# Patient Record
Sex: MALE | Race: WHITE | NOT HISPANIC OR LATINO
[De-identification: names, ages, dates, MRNs, and addresses within clinical notes are randomized per-mention and may not be internally consistent; named-entity substitution may affect disease eponyms.]

---

## 2022-08-25 PROBLEM — Z00.00 ENCOUNTER FOR PREVENTIVE HEALTH EXAMINATION: Status: ACTIVE | Noted: 2022-08-25

## 2022-09-06 ENCOUNTER — APPOINTMENT (OUTPATIENT)
Dept: ORTHOPEDIC SURGERY | Facility: CLINIC | Age: 57
End: 2022-09-06

## 2023-02-03 ENCOUNTER — APPOINTMENT (OUTPATIENT)
Dept: ORTHOPEDIC SURGERY | Facility: CLINIC | Age: 58
End: 2023-02-03
Payer: MEDICARE

## 2023-02-03 VITALS — BODY MASS INDEX: 26.21 KG/M2 | WEIGHT: 167 LBS | HEIGHT: 67 IN

## 2023-02-03 PROCEDURE — 73522 X-RAY EXAM HIPS BI 3-4 VIEWS: CPT

## 2023-02-03 PROCEDURE — 99204 OFFICE O/P NEW MOD 45 MIN: CPT

## 2023-02-03 RX ORDER — MELOXICAM 15 MG/1
15 TABLET ORAL
Qty: 30 | Refills: 2 | Status: ACTIVE | COMMUNITY
Start: 2023-02-03 | End: 1900-01-01

## 2023-02-03 NOTE — IMAGING
[de-identified] : Constitutional: well developed and well nourished, able to communicate\par Cardiovascular: Peripheral vascular exam is grossly normal\par Neurologic: Alert and oriented, no acute distress.\par Skin: normal skin with no ulcers, rashes, or lesions\par Pulmonary: No respiratory distress, breathing comfortably on room air\par Lymphatics: No obvious lymphadenopathy or lymphedema in areas examined\par \par LOWER EXTREMITY/ RIGHT HIP EXAM\par Standing pelvic alignment: Symmetric with no Trendelenburg\par Atrophy: none\par Ecchymosis/swelling: none\par \par Range of Motion\par Hip: Flexion/extension/ER/IR     / EX 20/ ER 45/ IR 20 / AB 60 /AD 20\par Impingement with flexion adduction and internal rotation: POS\par Contracture: none\par Snapping hip: negative\par Greater trochanter: no tenderness\par pain with resisted flexion\par \par Neurovascular\par Distal extremities: warm to touch\par Sensation to light touch: intact\par Muscle strength: 5/5\par \par Back\par Alignment: normal\par Spinous process: no tenderness\par Paraspinal tenderness: No tenderness\par Range of motion: full and pain free\par Scoliosis: none\par Straight leg sign: negative\par \par IMAGING:\par 02/03/2023 Xrays of the Right hip 3v were taken demonstrating

## 2023-02-03 NOTE — ASSESSMENT
[FreeTextEntry1] : 57 year M with right hip mild OA and right hip flexor tendonitis\par - physical therapy, NSAIDs\par - MRI and injection of right hip if no relief

## 2023-02-03 NOTE — HISTORY OF PRESENT ILLNESS
[Gradual] : gradual [9] : 9 [2] : 2 [Dull/Aching] : dull/aching [Sharp] : sharp [Occasional] : occasional [Retired] : Work status: retired [de-identified] : 02/03/2023 pt states he has no injury or trauma he started  having pain over the years. pt states the pain radiating from his hip to groin which take him a little time to stand up and walk when he sit for a long period of time  [] : no [FreeTextEntry7] : groin

## 2023-02-04 ENCOUNTER — TRANSCRIPTION ENCOUNTER (OUTPATIENT)
Age: 58
End: 2023-02-04

## 2023-02-16 ENCOUNTER — APPOINTMENT (OUTPATIENT)
Dept: ORTHOPEDIC SURGERY | Facility: CLINIC | Age: 58
End: 2023-02-16

## 2023-03-13 ENCOUNTER — APPOINTMENT (OUTPATIENT)
Dept: ORTHOPEDIC SURGERY | Facility: CLINIC | Age: 58
End: 2023-03-13
Payer: MEDICARE

## 2023-03-13 VITALS — HEIGHT: 67 IN | WEIGHT: 167 LBS | BODY MASS INDEX: 26.21 KG/M2

## 2023-03-13 DIAGNOSIS — F17.210 NICOTINE DEPENDENCE, CIGARETTES, UNCOMPLICATED: ICD-10-CM

## 2023-03-13 PROCEDURE — 99214 OFFICE O/P EST MOD 30 MIN: CPT

## 2023-03-13 PROCEDURE — 72040 X-RAY EXAM NECK SPINE 2-3 VW: CPT

## 2023-03-13 NOTE — PHYSICAL EXAM
[Flexion] : flexion [Extension] : extension [Rotation to left] : rotation to left [Rotation to right] : rotation to right [] : light touch intact throughout both upper extremities [Implants in position] : Implants in position [No loosening of hardware] : No loosening of hardware

## 2023-03-13 NOTE — ASSESSMENT
[FreeTextEntry1] : although he had technically satisfactory surgery (no hardware failure, healed well with ossification through the disc space); he continues to have residual symptoms that doesn’t have a surgically correctable solution; for that he continues to have limitations of what he can and cannot do; for now we continued with observation, hep and meds prn; \par \par Progress note completed by Meg Dang PA-C under the supervision of Albert Lewis MD\par

## 2023-03-13 NOTE — HISTORY OF PRESENT ILLNESS
[de-identified] : 58 yo M returns for f/u visit of the [facility]. States chronic neck pain and stifness unchanged from last visit in 2020. Pain at times into the shreyas shoulders. Denies arm pain. Denies n/t. s/p ACDF 2013. No current treatments or medications.  [FreeTextEntry5] : KELI SOLIS is a 57 year old male returning with neck pain. Radiates to b/l shoulders. Prvious visit 10/2020. States pain and symptoms are the same since that visit. Has life insurance paperwork to be filled out.

## 2023-03-17 ENCOUNTER — APPOINTMENT (OUTPATIENT)
Dept: ORTHOPEDIC SURGERY | Facility: CLINIC | Age: 58
End: 2023-03-17
Payer: MEDICARE

## 2023-03-17 ENCOUNTER — FORM ENCOUNTER (OUTPATIENT)
Age: 58
End: 2023-03-17

## 2023-03-17 VITALS — BODY MASS INDEX: 26.21 KG/M2 | WEIGHT: 167 LBS | HEIGHT: 67 IN

## 2023-03-17 PROCEDURE — 99214 OFFICE O/P EST MOD 30 MIN: CPT

## 2023-03-17 NOTE — HISTORY OF PRESENT ILLNESS
[Gradual] : gradual [9] : 9 [2] : 2 [Dull/Aching] : dull/aching [Sharp] : sharp [Occasional] : occasional [Retired] : Work status: retired [de-identified] : 02/03/2023 pt states he has no injury or trauma he started  having pain over the years. pt states the pain radiating from his hip to groin which take him a little time to stand up and walk when he sit for a long period of time \par \par 03/17/2023 follow up right hip,  pt states he is  feeling same as last visit. that the physical therapy does’t help. [] : no [FreeTextEntry7] : groin

## 2023-03-17 NOTE — ASSESSMENT
[FreeTextEntry1] : 57 year M with right hip mild OA and right hip flexor tendonitis\par - physical therapy, NSAIDs\par - MRI and injection of right hip if no relief\par \par 03/17/2023 no relief with PT. MRI of the right hip and fu after completion

## 2023-03-17 NOTE — IMAGING
[de-identified] : Constitutional: well developed and well nourished, able to communicate\par Cardiovascular: Peripheral vascular exam is grossly normal\par Neurologic: Alert and oriented, no acute distress.\par Skin: normal skin with no ulcers, rashes, or lesions\par Pulmonary: No respiratory distress, breathing comfortably on room air\par Lymphatics: No obvious lymphadenopathy or lymphedema in areas examined\par \par LOWER EXTREMITY/ RIGHT HIP EXAM\par Standing pelvic alignment: Symmetric with no Trendelenburg\par Atrophy: none\par Ecchymosis/swelling: none\par \par Range of Motion\par Hip: Flexion/extension/ER/IR     / EX 20/ ER 45/ IR 20 / AB 60 /AD 20\par Impingement with flexion adduction and internal rotation: POS\par Contracture: none\par Snapping hip: negative\par Greater trochanter: no tenderness\par pain with resisted flexion\par \par Neurovascular\par Distal extremities: warm to touch\par Sensation to light touch: intact\par Muscle strength: 5/5\par \par Back\par Alignment: normal\par Spinous process: no tenderness\par Paraspinal tenderness: No tenderness\par Range of motion: full and pain free\par Scoliosis: none\par Straight leg sign: negative\par \par IMAGING:\par 02/03/2023 Xrays of the Right hip 3v were taken demonstrating no signs of fractures, dislocations,or significant arthritis.

## 2023-03-18 ENCOUNTER — APPOINTMENT (OUTPATIENT)
Dept: MRI IMAGING | Facility: CLINIC | Age: 58
End: 2023-03-18
Payer: MEDICARE

## 2023-03-18 PROCEDURE — 73721 MRI JNT OF LWR EXTRE W/O DYE: CPT | Mod: RT

## 2023-03-31 ENCOUNTER — APPOINTMENT (OUTPATIENT)
Dept: ORTHOPEDIC SURGERY | Facility: CLINIC | Age: 58
End: 2023-03-31
Payer: MEDICARE

## 2023-03-31 VITALS — WEIGHT: 167 LBS | BODY MASS INDEX: 26.21 KG/M2 | HEIGHT: 67 IN

## 2023-03-31 DIAGNOSIS — M16.11 UNILATERAL PRIMARY OSTEOARTHRITIS, RIGHT HIP: ICD-10-CM

## 2023-03-31 DIAGNOSIS — S76.011S STRAIN OF MUSCLE, FASCIA AND TENDON OF RIGHT HIP, SEQUELA: ICD-10-CM

## 2023-03-31 DIAGNOSIS — M76.891 OTHER SPECIFIED ENTHESOPATHIES OF RIGHT LOWER LIMB, EXCLUDING FOOT: ICD-10-CM

## 2023-03-31 PROCEDURE — 99213 OFFICE O/P EST LOW 20 MIN: CPT

## 2023-03-31 RX ORDER — MELOXICAM 15 MG/1
15 TABLET ORAL
Qty: 30 | Refills: 2 | Status: ACTIVE | COMMUNITY
Start: 2023-03-31 | End: 1900-01-01

## 2023-03-31 NOTE — HISTORY OF PRESENT ILLNESS
[Gradual] : gradual [9] : 9 [2] : 2 [Dull/Aching] : dull/aching [Sharp] : sharp [Occasional] : occasional [Retired] : Work status: retired [de-identified] : 02/03/2023 pt states he has no injury or trauma he started  having pain over the years. pt states the pain radiating from his hip to groin which take him a little time to stand up and walk when he sit for a long period of time \par \par 03/17/2023 follow up right hip,  pt states he is  feeling same as last visit. that the physical therapy does’t help. [] : no [FreeTextEntry7] : groin

## 2023-03-31 NOTE — IMAGING
[de-identified] : Constitutional: well developed and well nourished, able to communicate\par Cardiovascular: Peripheral vascular exam is grossly normal\par Neurologic: Alert and oriented, no acute distress.\par Skin: normal skin with no ulcers, rashes, or lesions\par Pulmonary: No respiratory distress, breathing comfortably on room air\par Lymphatics: No obvious lymphadenopathy or lymphedema in areas examined\par \par LOWER EXTREMITY/ RIGHT HIP EXAM\par Standing pelvic alignment: Symmetric with no Trendelenburg\par Atrophy: none\par Ecchymosis/swelling: none\par \par Range of Motion\par Hip: Flexion/extension/ER/IR     / EX 20/ ER 45/ IR 20 / AB 60 /AD 20\par Impingement with flexion adduction and internal rotation: POS\par Contracture: none\par Snapping hip: negative\par Greater trochanter: no tenderness\par pain with resisted flexion\par \par Neurovascular\par Distal extremities: warm to touch\par Sensation to light touch: intact\par Muscle strength: 5/5\par \par Back\par Alignment: normal\par Spinous process: no tenderness\par Paraspinal tenderness: No tenderness\par Range of motion: full and pain free\par Scoliosis: none\par Straight leg sign: negative\par \par IMAGING:\par 02/03/2023 Xrays of the Right hip 3v were taken demonstrating no signs of fractures, dislocations,or significant arthritis. \par MRI:\par 1. Mild arthrosis in the superior anterior aspect of the right hip joint with labral tearing, chondral loss, and bone \par spurs without marrow edema, effusion or loose body at the right hip.\par 2. Left inguinal hernia containing fat measuring approximately 4 cm. Clinical correlation is recommended.\par 3. No acute fracture involving the visualized bony pelvis.\par 4. Degenerative changes in the lower lumbar spine which appears convex towards the left and degenerative \par endplate changes asymmetric towards the right in the visualized lower lumbar spine incompletely evaluated on \par the current exam.\par 5. Mild bilateral gluteal tendinopathy and hamstring insertional tendinopathy without tear.

## 2023-03-31 NOTE — ASSESSMENT
[FreeTextEntry1] : 57 year M with right hip mild OA and right hip flexor tendonitis\par - physical therapy, NSAIDs\par - MRI and injection of right hip if no relief\par \par 03/17/2023 no relief with PT. MRI of the right hip and fu after completion\par \par 03/31/2023 gluteal tendinopathy and mild hip OA\par continue PT, PRN injections

## 2023-05-23 ENCOUNTER — APPOINTMENT (OUTPATIENT)
Dept: ORTHOPEDIC SURGERY | Facility: CLINIC | Age: 58
End: 2023-05-23
Payer: MEDICARE

## 2023-05-23 VITALS — BODY MASS INDEX: 26.21 KG/M2 | WEIGHT: 167 LBS | HEIGHT: 67 IN

## 2023-05-23 DIAGNOSIS — Z98.1 ARTHRODESIS STATUS: ICD-10-CM

## 2023-05-23 PROCEDURE — 99213 OFFICE O/P EST LOW 20 MIN: CPT

## 2023-05-23 NOTE — HISTORY OF PRESENT ILLNESS
[de-identified] : 5-23-23- \par \par 56 yo M returns for f/u visit of the [facility]. States chronic neck pain and stiffness unchanged from last visit in 2020. Pain at times into the shreyas shoulders. Denies arm pain. Denies n/t. s/p ACDF 2013. No current treatments or medications.  [FreeTextEntry5] : KELI SOLIS is a 57 year old male returning with neck pain. Radiates to b/l shoulders. Prvious visit 10/2020. States pain and symptoms are the same since that visit. Has life insurance paperwork to be filled out.

## 2023-05-23 NOTE — ASSESSMENT
[FreeTextEntry1] : although he had technically satisfactory surgery (no hardware failure, healed well with ossification through the disc space); he continues to have residual symptoms that doesn’t have a surgically correctable solution; for that he continues to have limitations of what he can and cannot do; for now we continued with observation, hep and meds prn; \par \par